# Patient Record
Sex: MALE | Race: WHITE | HISPANIC OR LATINO | Employment: FULL TIME | URBAN - METROPOLITAN AREA
[De-identification: names, ages, dates, MRNs, and addresses within clinical notes are randomized per-mention and may not be internally consistent; named-entity substitution may affect disease eponyms.]

---

## 2021-08-08 ENCOUNTER — HOSPITAL ENCOUNTER (OUTPATIENT)
Facility: MEDICAL CENTER | Age: 26
End: 2021-08-08
Attending: NURSE PRACTITIONER

## 2021-08-08 ENCOUNTER — OFFICE VISIT (OUTPATIENT)
Dept: URGENT CARE | Facility: PHYSICIAN GROUP | Age: 26
End: 2021-08-08

## 2021-08-08 VITALS
BODY MASS INDEX: 21.47 KG/M2 | HEART RATE: 86 BPM | TEMPERATURE: 98.6 F | OXYGEN SATURATION: 100 % | DIASTOLIC BLOOD PRESSURE: 54 MMHG | HEIGHT: 70 IN | SYSTOLIC BLOOD PRESSURE: 112 MMHG | RESPIRATION RATE: 16 BRPM | WEIGHT: 150 LBS

## 2021-08-08 DIAGNOSIS — Z20.2: ICD-10-CM

## 2021-08-08 DIAGNOSIS — R30.0 DYSURIA: ICD-10-CM

## 2021-08-08 DIAGNOSIS — R36.9 PENILE DISCHARGE: Primary | ICD-10-CM

## 2021-08-08 PROCEDURE — 99203 OFFICE O/P NEW LOW 30 MIN: CPT | Performed by: NURSE PRACTITIONER

## 2021-08-08 PROCEDURE — 87591 N.GONORRHOEAE DNA AMP PROB: CPT

## 2021-08-08 PROCEDURE — 87491 CHLMYD TRACH DNA AMP PROBE: CPT

## 2021-08-08 RX ORDER — DOXYCYCLINE HYCLATE 100 MG
100 TABLET ORAL 2 TIMES DAILY
Qty: 14 TABLET | Refills: 0 | Status: SHIPPED | OUTPATIENT
Start: 2021-08-08 | End: 2021-08-15

## 2021-08-08 ASSESSMENT — ENCOUNTER SYMPTOMS
FLANK PAIN: 0
NAUSEA: 0
FEVER: 0
ABDOMINAL PAIN: 0
VOMITING: 0
DIARRHEA: 0
CHILLS: 0

## 2021-08-08 NOTE — PATIENT INSTRUCTIONS
Chlamydia, Male    Chlamydia is an STD (sexually transmitted disease). It is a bacterial infection that spreads through sexual contact (is contagious). Chlamydia can occur in different areas of the body, including the tube that moves urine from the bladder out of the body (urethra), the throat, or the rectum. This condition is not difficult to treat. However, if left untreated, chlamydia can lead to more serious health problems.  What are the causes?  Chlamydia is caused by the bacteria Chlamydia trachomatis. It is passed from an infected partner during sexual activity. Chlamydia can spread through contact with the genitals, mouth, or rectum.  What are the signs or symptoms?  In some cases, there may not be any symptoms for this condition (asymptomatic), especially early in the infection. If symptoms develop, they may include:  · Burning when urinating.  · Urinating frequently.  · Pain or swelling in the testicles.  · Watery, mucus-like discharge from the penis.  · Redness, soreness, and swelling (inflammation) of the rectum.  · Bleeding or discharge from the rectum.  · Abdominal pain.  · Itching, burning, or redness in the eyes, or discharge from the eyes.  How is this diagnosed?  This condition may be diagnosed based on:  · Urine tests.  · Swab tests. Depending on your symptoms, your health care provider may use a cotton swab to collect discharge from your urethra or rectum to test for the bacteria.  How is this treated?  This condition is treated with antibiotic medicines.  Follow these instructions at home:  Medicines  · Take over-the-counter and prescription medicines only as told by your health care provider.  · Take your antibiotic medicine as told by your health care provider. Do not stop taking the antibiotic even if you start to feel better.  Sexual activity  · Tell sexual partners about your infection. This includes any oral, anal, or vaginal sex partners you have had within 60 days of when your symptoms  started. Sexual partners should also be treated, even if they have no signs of the disease.  · Do not have sex until you and your sexual partners have completed treatment and your health care provider says it is okay. If your health care provider prescribed you a single dose treatment, wait 7 days after taking the treatment before having sex.  General instructions  · It is your responsibility to get your test results. Ask your health care provider, or the department performing the test, when your results will be ready.  · Get plenty of rest.  · Eat a healthy, well-balanced diet.  · Drink enough fluids to keep your urine clear or pale yellow.  · Keep all follow-up visits as told by your health care provider. This is important. You may need to be tested for infection again 3 months after treatment.  How is this prevented?  The only sure way to prevent chlamydia is to avoid sexual intercourse. However, you can lower your risk by:  · Using latex condoms correctly every time you have sexual intercourse.  · Not having multiple sexual partners.  · Asking if your sexual partner has been tested for STIs and had negative results.  Contact a health care provider if:  · You develop new symptoms or your symptoms do not get better after completing treatment.  · You have a fever or chills.  · You have pain during sexual intercourse.  · You develop new joint pain or swelling near your joints.  · You have pain or soreness in your testicles.  Get help right away if:  · Your pain gets worse and does not get better with medicine.  · You have abnormal discharge.  · You develop flu-like symptoms, such as night sweats, sore throat, or muscle aches.  Summary  · Chlamydia is an STD (sexually transmitted disease). It is a bacterial infection that spreads (is contagious) through sexual contact.  · This condition is not difficult to treat, however, if left untreated, it can lead to more serious health problems.  · In some cases, there may not  be any symptoms for this condition (asymptomatic).  · This condition is treated with antibiotic medicines.  · Using latex condoms correctly every time you have sexual intercourse can help prevent chlamydia.  This information is not intended to replace advice given to you by your health care provider. Make sure you discuss any questions you have with your health care provider.  Document Released: 2006 Document Revised: 2019 Document Reviewed: 2017  [employer] Patient Education ©  [employer]  Gonorrhea  Gonorrhea is a sexually transmitted disease (STD) that can affect both men and women. If left untreated, this infection can:  · Damage the female or male organs.  · Cause women and men to be unable to have children (be sterile).  · Harm a fetus if an infected woman is pregnant.  It is important to get treatment for gonorrhea as soon as possible. It is also necessary for all of your sexual partners to be tested for the infection.  What are the causes?  This condition is caused by bacteria called Neisseria gonorrhoeae. The infection is spread from person to person through sexual contact, including oral, anal, and vaginal sex. A  can contract the infection from his or her mother during birth.  What increases the risk?  The following factors may make you more likely to develop this condition:  · Being a woman who is younger than 25 years of age and who is sexually active.  · Being a woman 25 years of age or older who has:  ? A new sex partner.  ? More than one sex partner.  ? A sex partner who has an STD.  · Being a man who has:  ? A new sex partner.  ? More than one sex partner.  ? A sex partner who has an STD.  · Using condoms inconsistently.  · Currently having, or having previously had, an STD.  · Exchanging sex for money or drugs.  What are the signs or symptoms?  Some people do not have any symptoms. If you do have symptoms, they may be different for females and males.  For  females  · Pain in the lower abdomen.  · Abnormal vaginal discharge. The discharge may be cloudy, thick, or yellow-green in color.  · Bleeding between periods.  · Painful sex.  · Burning or itching in and around the vagina.  · Pain or burning when urinating.  · Irritation, pain, bleeding, or discharge from the rectum. This may occur if the infection was spread by anal sex.  · Sore throat or swollen lymph nodes in the neck. This may occur if the infection was spread by oral sex.  For males  · Abnormal discharge from the penis. This discharge may be cloudy, thick, or yellow-green in color.  · Pain or burning during urination.  · Pain or swelling in the testicles.  · Irritation, pain, bleeding, or discharge from the rectum. This may occur if the infection was spread by anal sex.  · Sore throat, fever, or swollen lymph nodes in the neck. This may occur if the infection was spread by oral sex.  How is this diagnosed?  This condition is diagnosed based on:  · A physical exam.  · A sample of discharge that is examined under a microscope to look for the bacteria. The discharge may be taken from the urethra, cervix, throat, or rectum.  · Urine tests.  Not all of test results will be available during your visit.  How is this treated?  This condition is treated with antibiotic medicines. It is important for treatment to begin as soon as possible. Early treatment may prevent some problems from developing. Do not have sex during treatment. Avoid all types of sexual activity for 7 days after treatment is complete and until any sex partners have been treated.  Follow these instructions at home:  · Take over-the-counter and prescription medicines only as told by your health care provider.  · Take your antibiotic medicine as told by your health care provider. Do not stop taking the antibiotic even if you start to feel better.  · Do not have sex until at least 7 days after you and your partner(s) have finished treatment and your  health care provider says it is okay.  · It is your responsibility to get your test results. Ask your health care provider, or the department performing the test, when your results will be ready.  · If you test positive for gonorrhea, inform your recent sexual partners. This includes any oral, anal, or vaginal sex partners. They need to be checked for gonorrhea even if they do not have symptoms. They may need treatment, even if they test negative for gonorrhea.  · Keep all follow-up visits as told by your health care provider. This is important.  How is this prevented?    · Use latex condoms correctly every time you have sexual intercourse.  · Ask if your sexual partner has been tested for STDs and had negative results.  · Avoid having multiple sexual partners.  Contact a health care provider if:  · You develop a bad reaction to the medicine you were prescribed. This may include:  ? A rash.  ? Nausea.  ? Vomiting.  ? Diarrhea.  · Your symptoms do not get better after a few days of taking antibiotics.  · Your symptoms get worse.  · You develop new symptoms.  · Your pain gets worse.  · You have a fever.  · You develop pain, itching, or discharge around the eyes.  Get help right away if:  · You feel dizzy or faint.  · You have trouble breathing or have shortness of breath.  · You develop an irregular heartbeat.  · You have severe abdominal pain with or without shoulder pain.  · You develop any bumps or sores (lesions) on your skin.  · You develop warmth, redness, pain, or swelling around your joints, such as the knee.  Summary  · Gonorrhea is an STD that can affect both men and women.  · This condition is caused by bacteria called Neisseria gonorrhoeae. The infection is spread from person to person, usually through sexual contact, including oral, anal, and vaginal sex.  · Symptoms vary between males and females. Generally, they include abnormal discharge and burning during urination. Women may also experience painful  sex, itching around the vagina, and bleeding between menstrual periods. Men may also experience swelling of the testicles.  · This condition is treated with antibiotic medicines. Do not have sex until at least 7 days after completing antibiotic treatment.  · If left untreated, gonorrhea can have serious side effects and complications.  This information is not intended to replace advice given to you by your health care provider. Make sure you discuss any questions you have with your health care provider.  Document Released: 12/15/2001 Document Revised: 01/24/2020 Document Reviewed: 11/17/2017  [employer] Patient Education © 2020 [employer]

## 2021-08-08 NOTE — PROGRESS NOTES
Subjective:     Sarbjit Reed is a 25 y.o. male who presents for Exposure to STD (x1 month burning )      Exposure to STD  This is a new problem. The current episode started 1 to 4 weeks ago (1 month ago Sarbjit developed clear/white discharge from his penis. He states his last partner did test positive for Chlamydia). The problem has been unchanged. Associated symptoms include urinary symptoms. Pertinent negatives include no abdominal pain, chills, fever, nausea or vomiting. Nothing aggravates the symptoms. He has tried nothing for the symptoms.         Review of Systems   Constitutional: Negative for chills and fever.   Gastrointestinal: Negative for abdominal pain, diarrhea, nausea and vomiting.   Genitourinary: Positive for dysuria. Negative for flank pain, frequency, hematuria and urgency.       PMH: No past medical history on file.  ALLERGIES: Not on File  SURGHX: No past surgical history on file.  SOCHX:   Social History     Socioeconomic History   • Marital status: Single     Spouse name: Not on file   • Number of children: Not on file   • Years of education: Not on file   • Highest education level: Not on file   Occupational History   • Not on file   Tobacco Use   • Smoking status: Current Every Day Smoker   • Smokeless tobacco: Never Used   Vaping Use   • Vaping Use: Never used   Substance and Sexual Activity   • Alcohol use: Not on file   • Drug use: Not on file   • Sexual activity: Not on file   Other Topics Concern   • Not on file   Social History Narrative   • Not on file     Social Determinants of Health     Financial Resource Strain:    • Difficulty of Paying Living Expenses:    Food Insecurity:    • Worried About Running Out of Food in the Last Year:    • Ran Out of Food in the Last Year:    Transportation Needs:    • Lack of Transportation (Medical):    • Lack of Transportation (Non-Medical):    Physical Activity:    • Days of Exercise per Week:    • Minutes of Exercise per Session:    Stress:    •  "Feeling of Stress :    Social Connections:    • Frequency of Communication with Friends and Family:    • Frequency of Social Gatherings with Friends and Family:    • Attends [employer] Services:    • Active Member of Clubs or Organizations:    • Attends Club or Organization Meetings:    • Marital Status:    Intimate Partner Violence:    • Fear of Current or Ex-Partner:    • Emotionally Abused:    • Physically Abused:    • Sexually Abused:      FH: No family history on file.      Objective:   /54   Pulse 86   Temp 37 °C (98.6 °F) (Temporal)   Resp 16   Ht 1.778 m (5' 10\")   Wt 68 kg (150 lb)   SpO2 100%   BMI 21.52 kg/m²     Physical Exam  Vitals and nursing note reviewed.   Constitutional:       General: He is not in acute distress.     Appearance: Normal appearance. He is not ill-appearing.   HENT:      Head: Normocephalic and atraumatic.      Right Ear: External ear normal.      Left Ear: External ear normal.      Nose: No congestion or rhinorrhea.      Mouth/Throat:      Mouth: Mucous membranes are moist.   Eyes:      Extraocular Movements: Extraocular movements intact.      Pupils: Pupils are equal, round, and reactive to light.   Cardiovascular:      Rate and Rhythm: Normal rate and regular rhythm.      Pulses: Normal pulses.      Heart sounds: Normal heart sounds.   Pulmonary:      Effort: Pulmonary effort is normal.      Breath sounds: Normal breath sounds.   Abdominal:      General: Abdomen is flat. Bowel sounds are normal.      Palpations: Abdomen is soft.      Tenderness: There is no abdominal tenderness. There is no right CVA tenderness or left CVA tenderness.   Musculoskeletal:         General: Normal range of motion.      Cervical back: Normal range of motion and neck supple.   Skin:     General: Skin is warm and dry.      Capillary Refill: Capillary refill takes less than 2 seconds.   Neurological:      General: No focal deficit present.      Mental Status: He is alert and oriented to " person, place, and time. Mental status is at baseline.   Psychiatric:         Mood and Affect: Mood normal.         Behavior: Behavior normal.         Thought Content: Thought content normal.         Judgment: Judgment normal.       POCT urine: Negative  Assessment/Plan:   Assessment    1. Penile discharge  CHLAMYDIA/GC PCR URINE OR SWAB    cefTRIAXone (ROCEPHIN) 500 mg, lidocaine (XYLOCAINE) 1 % 1.8 mL for IM use    doxycycline (VIBRAMYCIN) 100 MG Tab   2. Dysuria  CHLAMYDIA/GC PCR URINE OR SWAB    cefTRIAXone (ROCEPHIN) 500 mg, lidocaine (XYLOCAINE) 1 % 1.8 mL for IM use    doxycycline (VIBRAMYCIN) 100 MG Tab   3. Exposure to chlamydia, mucopurulent cervitis/nongonococcal urethritis  cefTRIAXone (ROCEPHIN) 500 mg, lidocaine (XYLOCAINE) 1 % 1.8 mL for IM use    doxycycline (VIBRAMYCIN) 100 MG Tab     He was empirically treated for gonorrhea and chlamydia in the office today.  He was encouraged to refrain from sexual activity for the next 2 weeks following treatment.   AVS handout given and reviewed with patient. Pt educated on red flags and when to seek treatment back in ER or UC.   Regarding her time here

## 2021-08-09 DIAGNOSIS — R36.9 PENILE DISCHARGE: ICD-10-CM

## 2021-08-09 DIAGNOSIS — R30.0 DYSURIA: ICD-10-CM

## 2021-08-09 LAB
C TRACH DNA SPEC QL NAA+PROBE: POSITIVE
N GONORRHOEA DNA SPEC QL NAA+PROBE: NEGATIVE
SPECIMEN SOURCE: ABNORMAL